# Patient Record
Sex: MALE | Race: WHITE | URBAN - METROPOLITAN AREA
[De-identification: names, ages, dates, MRNs, and addresses within clinical notes are randomized per-mention and may not be internally consistent; named-entity substitution may affect disease eponyms.]

---

## 2021-02-17 ENCOUNTER — OFFICE VISIT (OUTPATIENT)
Dept: FAMILY MEDICINE | Facility: CLINIC | Age: 51
End: 2021-02-17

## 2021-02-17 VITALS
SYSTOLIC BLOOD PRESSURE: 132 MMHG | TEMPERATURE: 97.8 F | DIASTOLIC BLOOD PRESSURE: 86 MMHG | OXYGEN SATURATION: 98 % | HEART RATE: 55 BPM | RESPIRATION RATE: 20 BRPM | WEIGHT: 195 LBS | HEIGHT: 69 IN | BODY MASS INDEX: 28.88 KG/M2

## 2021-02-17 DIAGNOSIS — Z98.890 HISTORY OF ROOT CANAL PROCEDURE: ICD-10-CM

## 2021-02-17 DIAGNOSIS — M25.562 LEFT KNEE PAIN, UNSPECIFIED CHRONICITY: ICD-10-CM

## 2021-02-17 DIAGNOSIS — Z01.818 PREOP GENERAL PHYSICAL EXAM: Primary | ICD-10-CM

## 2021-02-17 LAB
% GRANULOCYTES: 57 %
BUN SERPL-MCNC: 19 MG/DL (ref 7–25)
BUN/CREATININE RATIO: 17.9 (ref 6–22)
CALCIUM SERPL-MCNC: 9.3 MG/DL (ref 8.6–10.3)
CHLORIDE SERPLBLD-SCNC: 102.7 MMOL/L (ref 98–110)
CO2 SERPL-SCNC: 29.2 MMOL/L (ref 20–32)
CREAT SERPL-MCNC: 1.06 MG/DL (ref 0.6–1.3)
GLUCOSE SERPL-MCNC: 94 MG/DL (ref 60–99)
HCT VFR BLD AUTO: 42.4 % (ref 40–53)
HEMOGLOBIN: 13.3 G/DL (ref 13.3–17.7)
LYMPHOCYTES NFR BLD AUTO: 35.8 %
MCH RBC QN AUTO: 28.9 PG (ref 26–33)
MCHC RBC AUTO-ENTMCNC: 31.4 G/DL (ref 31–36)
MCV RBC AUTO: 92.2 FL (ref 78–100)
MONOCYTES NFR BLD AUTO: 7.2 %
PLATELET COUNT - QUEST: 236 10^9/L (ref 150–375)
POTASSIUM SERPL-SCNC: 4.17 MMOL/L (ref 3.5–5.3)
RBC # BLD AUTO: 4.6 10*12/L (ref 4.4–5.9)
SODIUM SERPL-SCNC: 141 MMOL/L (ref 135–146)
WBC # BLD AUTO: 7.6 10*9/L (ref 4–11)

## 2021-02-17 PROCEDURE — 99214 OFFICE O/P EST MOD 30 MIN: CPT | Performed by: FAMILY MEDICINE

## 2021-02-17 PROCEDURE — 80048 BASIC METABOLIC PNL TOTAL CA: CPT | Performed by: FAMILY MEDICINE

## 2021-02-17 PROCEDURE — 85025 COMPLETE CBC W/AUTO DIFF WBC: CPT | Performed by: FAMILY MEDICINE

## 2021-02-17 RX ORDER — AMOXICILLIN 500 MG/1
CAPSULE ORAL
COMMUNITY
Start: 2021-02-15

## 2021-02-17 SDOH — HEALTH STABILITY: MENTAL HEALTH: HOW OFTEN DO YOU HAVE A DRINK CONTAINING ALCOHOL?: NOT ASKED

## 2021-02-17 SDOH — HEALTH STABILITY: MENTAL HEALTH: HOW MANY STANDARD DRINKS CONTAINING ALCOHOL DO YOU HAVE ON A TYPICAL DAY?: NOT ASKED

## 2021-02-17 SDOH — HEALTH STABILITY: MENTAL HEALTH: HOW OFTEN DO YOU HAVE 6 OR MORE DRINKS ON ONE OCCASION?: NOT ASKED

## 2021-02-17 ASSESSMENT — MIFFLIN-ST. JEOR: SCORE: 1744.89

## 2021-02-17 NOTE — LETTER
[facility]  [address]  [address]  [facility] [address]  [phone]      February 18, 2021      Nabil Mcgregor  [address]  [facility] [address]      EMERGENCY CARE PLAN  Presenting Problem Treatment Plan   Questions or concerns during clinic hours I will call the clinic directly:    [facility]  [address] , [address]  [place] [address]  [phone]   Questions or concerns outside clinic hours  I will call the 24 hour line at [phone]   Patient needs to schedule an appointment  I will call the  scheduling line at [phone]   Same day treatment   I will call the clinic first, then  urgent care and/or  express care if needed   Clinic Care Coordinators Elyssa Hernandez RN:  [phone]  [facility] Clinical Support Staff: [phone]    Crisis Services:  Behavioral or Mental Health P ([facility])   [phone]   Emergency treatment--Immediately CALL [phone]

## 2021-02-17 NOTE — LETTER
February 19, 2021      Nabil Mcgregor  [address]  [place] [address]        Dear ,    We are writing to inform you of your test results.  Your labs were good, kidney function and cbc.    Resulted Orders   HEMOGRAM PLATELET DIFF (BFP)   Result Value Ref Range    WBC 7.6 4.0 - 11 10*9/L    RBC Count 4.60 4.4 - 5.9 10*12/L    Hemoglobin 13.3 13.3 - 17.7 g/dL    Hematocrit 42.4 40.0 - 53.0 %    MCV 92.2 78 - 100 fL    MCH 28.9 26 - 33 pg    MCHC 31.4 31 - 36 g/dL    Platelet Count 236 150 - 375 10^9/L    % Granulocytes 57.0 %    % Lymphocytes 35.8 %    % Monocytes 7.2 %   Basic Metabolic Panel (BFP)   Result Value Ref Range    Carbon Dioxide 29.2 20 - 32 mmol/L    Creatinine 1.06 0.60 - 1.30 mg/dL    Glucose 94 60 - 99 mg/dL    Sodium 141.0 135 - 146 mmol/L    Potassium 4.17 3.5 - 5.3 mmol/L    Chloride 102.7 98 - 110 mmol/L    Urea Nitrogen 19 7 - 25 mg/dL    Calcium 9.3 8.6 - 10.3 mg/dL    BUN/Creatinine Ratio 17.9 6 - 22       If you have any questions or concerns, please call the clinic at the number listed above.       Sincerely,      Bridget Duarte MD

## 2021-02-17 NOTE — PROGRESS NOTES
[facility]  [address]  [address]  [facility] [address]  Phone: [phone]  Fax: [phone]  Primary Provider: Bridget Duarte        PREOPERATIVE EVALUATION:  Today's date: 2/17/2021    Nabil Mcgregor is a 50 year old male who presents for a preoperative evaluation.    Surgical Information:  Surgery/Procedure: Removal of ovulation from left knee  Surgery Location: [facility]   Surgeon: Dr. Pastor   Surgery Date: 2/24/2021  Time of Surgery: TBD  Where patient plans to recover: At home with family  Fax number for surgical facility:     Type of Anesthesia Anticipated: General    Assessment & Plan     The proposed surgical procedure is considered INTERMEDIATE risk.    Problem List Items Addressed This Visit     None      Visit Diagnoses     Preop general physical exam    -  Primary    Relevant Orders    HEMOGRAM PLATELET DIFF (BFP) (Completed)    Basic Metabolic Panel (BFP)    Left knee pain, unspecified chronicity        Relevant Medications    acetaminophen-codeine (TYLENOL #3) 300-30 MG tablet    History of root canal procedure            1. Preop general physical exam    - HEMOGRAM PLATELET DIFF (BFP)  - Basic Metabolic Panel (BFP)    2. Left knee pain, unspecified chronicity      3. History of root canal procedure  Discussed with surgeon, he is ok with proceeding with surgery.      Risks and Recommendations:  The patient has the following additional risks and recommendations for perioperative complications:   - No identified additional risk factors other than previously addressed    Medication Instructions:  Patient is on no chronic medications    RECOMMENDATION:  APPROVAL GIVEN to proceed with proposed procedure, without further diagnostic evaluation.    [id number]}    Subjective     HPI related to upcoming procedure:   Here for preop for left knee problems. Is very swollen, son first noted this. Twice the size. Is painful is just a nuisance, has been like  this for 3-4 months. Getting a little worse.     He started abx Monday, 2-3 days ago. He will be having a root canal next week Tuesday, day prior to his knee surgery.    Pre-operative Questions    No - Have you ever had a heart attack or stroke?  No - Have you ever had surgery on your heart or blood vessels, such as a stent, coronary (heart) bypass, or surgery on an artery in the head, neck, heart, or legs?  No - Do you have chest pain when you are physically active?  No - Do you have a history of heart failure?  No - Do you currently have a cold, bronchitis, or symptoms of other respiratory (head and chest) infections?  No - Do you have a cough, shortness of breath, or wheezing?  No - Do you or anyone in your family have a history of blood clots?  No - Do you or anyone in your family have a serious bleeding problem, such as long-lasting bleeding after surgeries or cuts?  No - Have you ever had anemia or been told to take iron pills?   No - Have you had any abnormal blood loss such as black, tarry or bloody stools, or abnormal vaginal bleeding?  No - Have you ever had a blood transfusion?  Yes - Are you willing to have a blood transfusion if it is medically needed before, during, or after your surgery?  No - Have you or anyone in your family ever had problems with anesthesia (sedation for surgery)?  No - Do you have sleep apnea, excessive snoring, or daytime drowsiness?   No - Do you have any artifical heart valves or other implanted medical devices, such as a pacemaker, defibrillator, or continuous glucose monitor?  No - Do you have any artifical joints?  No - Are you allergic to latex?  No - Is there any chance that you may be pregnant?      No flowsheet data found.    Health Care Directive:  Patient does not have a Health Care Directive or Living Will: Discussed advance care planning with patient; information given to patient to review.    :[id number]}    Status of Chronic Conditions:  None.    Review of  "Systems  CONSTITUTIONAL: NEGATIVE for fever, chills, change in weight  INTEGUMENTARY/SKIN: NEGATIVE for worrisome rashes, moles or lesions  EYES: NEGATIVE for vision changes or irritation  ENT/MOUTH: NEGATIVE for ear, mouth and throat problems  RESP: NEGATIVE for significant cough or SOB  BREAST: NEGATIVE for masses, tenderness or discharge  CV: NEGATIVE for chest pain, palpitations or peripheral edema  GI: NEGATIVE for nausea, abdominal pain, heartburn, or change in bowel habits  : NEGATIVE for frequency, dysuria, or hematuria  MUSCULOSKELETAL: NEGATIVE for significant arthralgias or myalgia  NEURO: NEGATIVE for weakness, dizziness or paresthesias  ENDOCRINE: NEGATIVE for temperature intolerance, skin/hair changes  HEME: NEGATIVE for bleeding problems  PSYCHIATRIC: NEGATIVE for changes in mood or affect    There are no active problems to display for this patient.     No past medical history on file.  No past surgical history on file.  Current Outpatient Medications   Medication Sig Dispense Refill     acetaminophen-codeine (TYLENOL #3) 300-30 MG tablet TAKE 1 TABLET BY MOUTH EVERY 4 TO 6 HOURS AS NEEDED FOR PAIN       amoxicillin (AMOXIL) 500 MG capsule TAKE 1 CAPSULE BY MOUTH 3 TIMES DAILY UNTIL GONE.         No Known Allergies     Social History     Tobacco Use     Smoking status: Never Smoker     Smokeless tobacco: Never Used   Substance Use Topics     Alcohol use: Not Currently     No family history on file.  History   Drug Use Not on file         Objective     /86 (BP Location: Right arm, Patient Position: Sitting, Cuff Size: Adult Large)   Pulse 55   Temp 97.8  F (36.6  C) (Oral)   Resp 20   Ht 1.753 m (5' 9\")   Wt 88.5 kg (195 lb)   SpO2 98%   BMI 28.80 kg/m      Physical Exam    GENERAL APPEARANCE: healthy, alert and no distress     EYES: EOMI,  PERRL     HENT: ear canals and TM's normal and nose and mouth without ulcers or lesions     NECK: no adenopathy, no asymmetry, masses, or scars and " thyroid normal to palpation     RESP: lungs clear to auscultation - no rales, rhonchi or wheezes     CV: regular rates and rhythm, normal S1 S2, no S3 or S4 and no murmur, click or rub     ABDOMEN:  soft, nontender, no HSM or masses and bowel sounds normal     MS: extremities normal- no gross deformities noted, no evidence of inflammation in joints, FROM in all extremities.     SKIN: no suspicious lesions or rashes     NEURO: Normal strength and tone, sensory exam grossly normal, mentation intact and speech normal     PSYCH: mentation appears normal. and affect normal/bright     LYMPHATICS: No cervical adenopathy    No results for input(s): HGB, PLT, INR, NA, POTASSIUM, CR, A1C in the last 28342 hours.        Results for orders placed or performed in visit on 02/17/21   HEMOGRAM PLATELET DIFF (BFP)     Status: None   Result Value Ref Range    WBC 7.6 4.0 - 11 10*9/L    RBC Count 4.60 4.4 - 5.9 10*12/L    Hemoglobin 13.3 13.3 - 17.7 g/dL    Hematocrit 42.4 40.0 - 53.0 %    MCV 92.2 78 - 100 fL    MCH 28.9 26 - 33 pg    MCHC 31.4 31 - 36 g/dL    Platelet Count 236 150 - 375 10^9/L    % Granulocytes 57.0 %    % Lymphocytes 35.8 %    % Monocytes 7.2 %   Basic Metabolic Panel (BFP)     Status: None   Result Value Ref Range    Carbon Dioxide 29.2 20 - 32 mmol/L    Creatinine 1.06 0.60 - 1.30 mg/dL    Glucose 94 60 - 99 mg/dL    Sodium 141.0 135 - 146 mmol/L    Potassium 4.17 3.5 - 5.3 mmol/L    Chloride 102.7 98 - 110 mmol/L    Urea Nitrogen 19 7 - 25 mg/dL    Calcium 9.3 8.6 - 10.3 mg/dL    BUN/Creatinine Ratio 17.9 6 - 22       Revised Cardiac Risk Index (RCRI):  The patient has the following serious cardiovascular risks for perioperative complications:   - No serious cardiac risks = 0 points     RCRI Interpretation: 0 points: Class I (very low risk - 0.4% complication rate)             Signed Electronically by: Bridget Duarte MD  Copy of this evaluation report is provided to requesting physician.    Preop  [facility] Preop Guidelines    Revised Cardiac Risk Index

## 2021-02-17 NOTE — NURSING NOTE
Chief Complaint   Patient presents with     Pre-Op Exam     DOS 2/24, Dr. Pastor with [facility], [facility], removal of ovulation from left knee

## 2021-02-17 NOTE — LETTER
February 19, 2021      Nabil Mcgregor  [address]  [place] [address]        Dear ,    We are writing to inform you of your test results.       Hello, your labs are ok.    Resulted Orders   HEMOGRAM PLATELET DIFF (BFP)   Result Value Ref Range    WBC 7.6 4.0 - 11 10*9/L    RBC Count 4.60 4.4 - 5.9 10*12/L    Hemoglobin 13.3 13.3 - 17.7 g/dL    Hematocrit 42.4 40.0 - 53.0 %    MCV 92.2 78 - 100 fL    MCH 28.9 26 - 33 pg    MCHC 31.4 31 - 36 g/dL    Platelet Count 236 150 - 375 10^9/L    % Granulocytes 57.0 %    % Lymphocytes 35.8 %    % Monocytes 7.2 %   Basic Metabolic Panel (BFP)   Result Value Ref Range    Carbon Dioxide 29.2 20 - 32 mmol/L    Creatinine 1.06 0.60 - 1.30 mg/dL    Glucose 94 60 - 99 mg/dL    Sodium 141.0 135 - 146 mmol/L    Potassium 4.17 3.5 - 5.3 mmol/L    Chloride 102.7 98 - 110 mmol/L    Urea Nitrogen 19 7 - 25 mg/dL    Calcium 9.3 8.6 - 10.3 mg/dL    BUN/Creatinine Ratio 17.9 6 - 22       If you have any questions or concerns, please call the clinic at the number listed above.       Sincerely,      Bridget Duarte MD

## 2021-02-18 PROBLEM — Z71.89 ACP (ADVANCE CARE PLANNING): Status: ACTIVE | Noted: 2021-02-18

## 2021-02-18 PROBLEM — Z76.89 HEALTH CARE HOME: Status: ACTIVE | Noted: 2021-02-18

## 2021-02-19 ENCOUNTER — TELEPHONE (OUTPATIENT)
Dept: FAMILY MEDICINE | Facility: CLINIC | Age: 51
End: 2021-02-19

## 2021-02-19 NOTE — TELEPHONE ENCOUNTER
Nabil Mcgregor called the clinic support line with the following:    Is returning you call    [phone]

## 2021-02-19 NOTE — TELEPHONE ENCOUNTER
Left pt detailed voicemail stating pre-op was approved. Advised pt to call back with any questions.

## 2021-02-24 ENCOUNTER — HOSPITAL PATHOLOGY (OUTPATIENT)
Dept: OTHER | Facility: CLINIC | Age: 51
End: 2021-02-24

## 2021-02-24 ENCOUNTER — HOSPITAL LABORATORY (OUTPATIENT)
Dept: OTHER | Facility: CLINIC | Age: 51
End: 2021-02-24

## 2021-02-24 LAB
GRAM STN SPEC: NORMAL
GRAM STN SPEC: NORMAL
SPECIMEN SOURCE: NORMAL

## 2021-02-25 LAB — COPATH REPORT: NORMAL

## 2021-03-01 LAB
BACTERIA SPEC CULT: NO GROWTH
SPECIMEN SOURCE: NORMAL

## 2021-03-03 LAB
BACTERIA SPEC CULT: NORMAL
Lab: NORMAL
SPECIMEN SOURCE: NORMAL

## 2024-06-17 PROBLEM — Z76.89 HEALTH CARE HOME: Status: RESOLVED | Noted: 2021-02-18 | Resolved: 2024-06-17
